# Patient Record
Sex: MALE | Race: WHITE | URBAN - METROPOLITAN AREA
[De-identification: names, ages, dates, MRNs, and addresses within clinical notes are randomized per-mention and may not be internally consistent; named-entity substitution may affect disease eponyms.]

---

## 2020-08-17 ENCOUNTER — NEW PATIENT (OUTPATIENT)
Dept: URBAN - METROPOLITAN AREA CLINIC 56 | Facility: CLINIC | Age: 70
End: 2020-08-17
Payer: MEDICARE

## 2020-08-17 DIAGNOSIS — H52.4 PRESBYOPIA: ICD-10-CM

## 2020-08-17 DIAGNOSIS — H25.813 COMBINED FORMS OF AGE-RELATED CATARACT, BILATERAL: Primary | ICD-10-CM

## 2020-08-17 DIAGNOSIS — S05.92XA UNSPECIFIED INJURY OF LEFT EYE AND ORBIT, INITIAL ENCOUNTER: ICD-10-CM

## 2020-08-17 DIAGNOSIS — H53.032 STRABISMIC AMBLYOPIA, LEFT EYE: ICD-10-CM

## 2020-08-17 PROCEDURE — 92004 COMPRE OPH EXAM NEW PT 1/>: CPT | Performed by: OPTOMETRIST

## 2020-08-17 PROCEDURE — 92250 FUNDUS PHOTOGRAPHY W/I&R: CPT | Performed by: OPTOMETRIST

## 2020-08-17 PROCEDURE — 92015 DETERMINE REFRACTIVE STATE: CPT | Performed by: OPTOMETRIST

## 2020-08-17 ASSESSMENT — INTRAOCULAR PRESSURE
OS: 8
OD: 8

## 2020-08-17 ASSESSMENT — VISUAL ACUITY
OS: 20/25
OD: 20/20

## 2020-08-17 ASSESSMENT — KERATOMETRY
OD: 42.03
OS: 40.74

## 2021-04-23 ENCOUNTER — OFFICE VISIT (OUTPATIENT)
Dept: URBAN - METROPOLITAN AREA CLINIC 44 | Facility: CLINIC | Age: 71
End: 2021-04-23
Payer: MEDICARE

## 2021-04-23 DIAGNOSIS — B30.0 EPIDEMIC KERATOCONJUNCTIVITIS: Primary | ICD-10-CM

## 2021-04-23 PROCEDURE — 99214 OFFICE O/P EST MOD 30 MIN: CPT | Performed by: OPTOMETRIST

## 2021-04-23 RX ORDER — ACYCLOVIR 400 MG/1
400 MG TABLET ORAL
Qty: 35 | Refills: 0 | Status: INACTIVE
Start: 2021-04-23 | End: 2021-05-02

## 2021-04-23 RX ORDER — PREDNISOLONE ACETATE 10 MG/ML
1 % SUSPENSION/ DROPS OPHTHALMIC
Qty: 10 | Refills: 0 | Status: INACTIVE
Start: 2021-04-23 | End: 2021-05-12

## 2021-04-23 NOTE — IMPRESSION/PLAN
Impression: Dry eye syndrome of bilateral lacrimal glands: H04.123. Plan: Patient's complaint is consistent with dry eye syndrome. There is no evidence of permanent changes to the cornea. Explained there are ways to improve the symptoms but no permanent fix for the symptoms. Reviewed options including topical treatments, punctal plugs and permanent punctal occlusion. use gel at night.

## 2021-04-23 NOTE — IMPRESSION/PLAN
Impression: Epidemic keratoconjunctivitis: B30.0. Plan: Pt has been on gent x 3 wks and getting worse, Treat as possible EKC start pf qid ou and aft gel q2h.  Acyclovir 400mg  5 x po for 1wk to # 40 cover possible disciform keratitis

## 2021-04-27 ENCOUNTER — OFFICE VISIT (OUTPATIENT)
Dept: URBAN - METROPOLITAN AREA CLINIC 44 | Facility: CLINIC | Age: 71
End: 2021-04-27
Payer: MEDICARE

## 2021-04-27 PROCEDURE — 92134 CPTRZ OPH DX IMG PST SGM RTA: CPT | Performed by: OPTOMETRIST

## 2021-04-27 PROCEDURE — 92012 INTRM OPH EXAM EST PATIENT: CPT | Performed by: OPTOMETRIST

## 2021-04-27 ASSESSMENT — INTRAOCULAR PRESSURE
OS: 13
OD: 12

## 2021-04-27 NOTE — IMPRESSION/PLAN
Impression: Punctate keratitis, bilateral: H16.143. Plan: Decrease pf BID OS  and continue aft gel q2h OU. Acyclovir 400mg  5 x po for  2 more days and d/c. Refer to Dr. Nick Prieto for CO. not EKC or viral, increase gel gtts q2h from qid and dec pf gtts bid OS only.  Dr Nick Prieto for opinion 4-6 wks CX appt if better

## 2021-05-12 ENCOUNTER — OFFICE VISIT (OUTPATIENT)
Dept: URBAN - METROPOLITAN AREA CLINIC 44 | Facility: CLINIC | Age: 71
End: 2021-05-12
Payer: MEDICARE

## 2021-05-12 PROCEDURE — 92025 CPTRIZED CORNEAL TOPOGRAPHY: CPT | Performed by: OPHTHALMOLOGY

## 2021-05-12 PROCEDURE — 76514 ECHO EXAM OF EYE THICKNESS: CPT | Performed by: OPHTHALMOLOGY

## 2021-05-12 PROCEDURE — 99203 OFFICE O/P NEW LOW 30 MIN: CPT | Performed by: OPHTHALMOLOGY

## 2021-05-12 ASSESSMENT — VISUAL ACUITY
OS: 20/80
OD: 20/60

## 2021-05-12 NOTE — IMPRESSION/PLAN
Impression: Punctate keratitis, bilateral: H16.143. Plan: Onset after what appears to be a viral conjunctivitis OU. Severe keratitis noted OU today. Start aggressive lubrication: PFATs q1hr q/a, andreas qhs OU Start Eysuvys/Inveltys QID OU x 2 weeks. Start Cequa OU BID Will place PROKERA SLIM OS first then OD

## 2021-05-26 ENCOUNTER — OFFICE VISIT (OUTPATIENT)
Dept: URBAN - METROPOLITAN AREA CLINIC 44 | Facility: CLINIC | Age: 71
End: 2021-05-26
Payer: MEDICARE

## 2021-05-26 PROCEDURE — 99213 OFFICE O/P EST LOW 20 MIN: CPT | Performed by: OPHTHALMOLOGY

## 2021-05-26 ASSESSMENT — INTRAOCULAR PRESSURE
OD: 16
OS: 16

## 2021-05-26 NOTE — IMPRESSION/PLAN
Impression: Punctate keratitis, bilateral: H16.143. Plan: Onset after what appears to be a viral conjunctivitis OU. Improved today, completed course of Inveltys Cont aggressive lubrication: PFATs q1hr q/a, andreas qhs OU Cont Cequa OU BID (hhas only been using a couple of days, will allow more time). 
Will place PROKERA SLIM OS first then OD if persistent nv

## 2021-06-08 ENCOUNTER — OFFICE VISIT (OUTPATIENT)
Dept: URBAN - METROPOLITAN AREA CLINIC 44 | Facility: CLINIC | Age: 71
End: 2021-06-08
Payer: MEDICARE

## 2021-06-08 DIAGNOSIS — H16.143 PUNCTATE KERATITIS, BILATERAL: Primary | ICD-10-CM

## 2021-06-08 PROCEDURE — 99213 OFFICE O/P EST LOW 20 MIN: CPT | Performed by: OPHTHALMOLOGY

## 2021-06-08 PROCEDURE — 65778 COVER EYE W/MEMBRANE: CPT | Performed by: OPHTHALMOLOGY

## 2021-06-08 RX ORDER — OFLOXACIN 3 MG/ML
0.3 % SOLUTION/ DROPS OPHTHALMIC
Qty: 5 | Refills: 1 | Status: INACTIVE
Start: 2021-06-08 | End: 2021-06-24

## 2021-06-08 NOTE — IMPRESSION/PLAN
Impression: Punctate keratitis, bilateral: H16.143. Plan: Onset after what appears to be a viral conjunctivitis OU. Pt feels worse today. Prokera slim placed OS Cont aggressive lubrication: PFATs q1hr q/a OU, andreas qhs OD Cont Cequa OU OD
RTC 1 week prokera ring removal OS and possible placement OD

## 2021-06-14 ENCOUNTER — OFFICE VISIT (OUTPATIENT)
Dept: URBAN - METROPOLITAN AREA CLINIC 44 | Facility: CLINIC | Age: 71
End: 2021-06-14
Payer: MEDICARE

## 2021-06-14 PROCEDURE — 99213 OFFICE O/P EST LOW 20 MIN: CPT | Performed by: OPHTHALMOLOGY

## 2021-06-14 PROCEDURE — 92071 CONTACT LENS FITTING FOR TX: CPT | Performed by: OPHTHALMOLOGY

## 2021-06-14 NOTE — IMPRESSION/PLAN
Impression: Punctate keratitis, bilateral: H16.143.
- Onset after what appears to be a viral conjunctivitis OU. Plan: Prokera slim placed removed OS, small abrasion noted, BCL 8.4 placed. Cont Oflox QID OS Cont aggressive lubrication: PFATs q1hr q/a OU, andreas qhs OD Cont Cequa OU OD

## 2021-06-18 ENCOUNTER — OFFICE VISIT (OUTPATIENT)
Dept: URBAN - METROPOLITAN AREA CLINIC 44 | Facility: CLINIC | Age: 71
End: 2021-06-18
Payer: MEDICARE

## 2021-06-18 PROCEDURE — 92012 INTRM OPH EXAM EST PATIENT: CPT | Performed by: OPTOMETRIST

## 2021-06-18 NOTE — IMPRESSION/PLAN
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 Plan: d/c bscl, continue all gtts as directed.

## 2021-07-26 ENCOUNTER — OFFICE VISIT (OUTPATIENT)
Dept: URBAN - METROPOLITAN AREA CLINIC 44 | Facility: CLINIC | Age: 71
End: 2021-07-26
Payer: MEDICARE

## 2021-07-26 PROCEDURE — 99213 OFFICE O/P EST LOW 20 MIN: CPT | Performed by: OPHTHALMOLOGY

## 2021-07-26 NOTE — IMPRESSION/PLAN
Impression: Punctate keratitis, bilateral: H16.143.
- Onset after what appears to be a viral conjunctivitis OU. Plan: Resolved s/p Prokera OU. d/c Oflox Cont lubrication: PFATs AT OU, andreas qhs OD
D/c [facility] May return to optom for routine monitoring

## 2022-01-04 ENCOUNTER — OFFICE VISIT (OUTPATIENT)
Dept: URBAN - METROPOLITAN AREA CLINIC 56 | Facility: CLINIC | Age: 72
End: 2022-01-04
Payer: MEDICARE

## 2022-01-04 PROCEDURE — 92134 CPTRZ OPH DX IMG PST SGM RTA: CPT | Performed by: OPTOMETRIST

## 2022-01-04 PROCEDURE — 99214 OFFICE O/P EST MOD 30 MIN: CPT | Performed by: OPTOMETRIST

## 2022-01-04 ASSESSMENT — KERATOMETRY
OS: 40.64
OD: 42.16

## 2022-01-04 ASSESSMENT — VISUAL ACUITY
OD: 20/20
OS: 20/25

## 2022-01-04 ASSESSMENT — INTRAOCULAR PRESSURE
OD: 15
OS: 14

## 2022-01-04 NOTE — IMPRESSION/PLAN
Impression: Punctate keratitis, bilateral: H16.143.
- Onset after what appears to be a viral conjunctivitis OU.  Plan: Cont lubrication: PFATs AT OU, andreas qhs OD

## 2022-01-04 NOTE — IMPRESSION/PLAN
Impression: Combined forms of age-related cataract, bilateral: H25.813. Plan: Discussed cataract diagnosis with the patient. Discussed and reviewed treatment options for cataracts. Risks and benefits of surgical treatment were discussed and understood. Patient elects surgical treatment. Recommend surgery OS. Patient is candidate/interested in standard lens, Aim OS: plano. Patient will need glasses for full time wear. Outcome of surgery limitations include:  Punctate keratitis, bilateral, Nodular corneal degeneration, bilateral, Past Refractive Surgery. Recommend Dr Stephany Sicard for cataract surgery.

## 2022-01-04 NOTE — IMPRESSION/PLAN
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 Plan: Appears stable at this time. Continue frequent lubrication daily. Cataract surgery with Dr Gurpreet Phillips.

## 2022-02-18 ENCOUNTER — TESTING ONLY (OUTPATIENT)
Dept: URBAN - METROPOLITAN AREA CLINIC 56 | Facility: CLINIC | Age: 72
End: 2022-02-18
Payer: MEDICARE

## 2022-02-18 DIAGNOSIS — Z01.818 ENCOUNTER FOR OTHER PREPROCEDURAL EXAMINATION: Primary | ICD-10-CM

## 2022-02-18 PROCEDURE — 99203 OFFICE O/P NEW LOW 30 MIN: CPT | Performed by: PHYSICIAN ASSISTANT

## 2022-02-18 PROCEDURE — 92025 CPTRIZED CORNEAL TOPOGRAPHY: CPT | Performed by: OPHTHALMOLOGY

## 2022-02-18 RX ORDER — ATORVASTATIN CALCIUM 40 MG/1
40 MG TABLET, FILM COATED ORAL
Qty: 0 | Refills: 0 | Status: ACTIVE
Start: 2022-02-18

## 2022-02-18 RX ORDER — LOSARTAN POTASSIUM 25 MG/1
25 MG TABLET ORAL
Qty: 0 | Refills: 0 | Status: ACTIVE
Start: 2022-02-18

## 2022-02-21 ENCOUNTER — PRE-OPERATIVE VISIT (OUTPATIENT)
Dept: URBAN - METROPOLITAN AREA CLINIC 44 | Facility: CLINIC | Age: 72
End: 2022-02-21
Payer: MEDICARE

## 2022-02-21 DIAGNOSIS — H18.453 NODULAR CORNEAL DEGENERATION, BILATERAL: ICD-10-CM

## 2022-02-21 DIAGNOSIS — H26.132 TOTAL TRAUMATIC CATARACT, LEFT EYE: Primary | ICD-10-CM

## 2022-02-21 DIAGNOSIS — Z98.890 OTHER SPECIFIED POSTPROCEDURAL STATES: ICD-10-CM

## 2022-02-21 PROCEDURE — 99214 OFFICE O/P EST MOD 30 MIN: CPT | Performed by: OPHTHALMOLOGY

## 2022-02-21 PROCEDURE — 76514 ECHO EXAM OF EYE THICKNESS: CPT | Performed by: OPHTHALMOLOGY

## 2022-02-21 ASSESSMENT — INTRAOCULAR PRESSURE
OS: 12
OD: 12

## 2022-02-21 NOTE — IMPRESSION/PLAN
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 Plan: Discussed cataract diagnosis with the patient. Risks and benefits of surgical treatment were discussed and understood. Patient elects surgical treatment. Specialty lens options, LenSx and ORA discussed. Patient does not mind wearing glasses after surgery. Patient desires surgery OS. Standard IOL OS,  AIM= Distance OU, ORA ok if pt desires,  AMP. Patient understands the need for glasses after surgery for BCVA. Dexycu OK. ** Discussed risk of zonular dehiscence and associated complications given remote h/o racketball injury.

## 2022-03-01 ENCOUNTER — SURGERY (OUTPATIENT)
Dept: URBAN - METROPOLITAN AREA SURGERY 19 | Facility: SURGERY | Age: 72
End: 2022-03-01
Payer: MEDICARE

## 2022-03-01 PROCEDURE — PR1CP PR1CP: CUSTOM | Performed by: OPHTHALMOLOGY

## 2022-03-01 PROCEDURE — 66984 XCAPSL CTRC RMVL W/O ECP: CPT | Performed by: OPHTHALMOLOGY

## 2022-03-02 ENCOUNTER — POST-OPERATIVE VISIT (OUTPATIENT)
Dept: URBAN - METROPOLITAN AREA CLINIC 56 | Facility: CLINIC | Age: 72
End: 2022-03-02

## 2022-03-02 DIAGNOSIS — Z48.810 ENCOUNTER FOR SURGICAL AFTERCARE FOLLOWING SURGERY ON A SENSE ORGAN: Primary | ICD-10-CM

## 2022-03-02 RX ORDER — DORZOLAMIDE HYDROCHLORIDE AND TIMOLOL MALEATE 20; 5 MG/ML; MG/ML
SOLUTION/ DROPS OPHTHALMIC
Qty: 5 | Refills: 0 | Status: ACTIVE
Start: 2022-03-02

## 2022-03-02 ASSESSMENT — INTRAOCULAR PRESSURE
OS: 34
OS: 38

## 2022-03-02 NOTE — IMPRESSION/PLAN
Impression: S/P Cataract Extraction by phacoemulsification with IOL placement; ORA OS - 1 Day. Encounter for surgical aftercare following surgery on a sense organ  Z48.170. Plan: S/p Cat Sx OS Aim (-0.25) Elevated IOP, Start Dorzolamide/Timolol BID OS 
RTC as scheduled for 3 WK PO

## 2022-03-22 ENCOUNTER — POST-OPERATIVE VISIT (OUTPATIENT)
Dept: URBAN - METROPOLITAN AREA CLINIC 56 | Facility: CLINIC | Age: 72
End: 2022-03-22

## 2022-03-22 PROCEDURE — 99024 POSTOP FOLLOW-UP VISIT: CPT | Performed by: OPTOMETRIST

## 2022-03-22 RX ORDER — PREDNISOLONE ACETATE 10 MG/ML
1 % SUSPENSION/ DROPS OPHTHALMIC
Qty: 5 | Refills: 0 | Status: ACTIVE
Start: 2022-03-22

## 2022-03-22 ASSESSMENT — VISUAL ACUITY
OD: 20/20
OS: 20/30

## 2022-03-22 ASSESSMENT — INTRAOCULAR PRESSURE
OD: 14
OS: 8
OS: 7

## 2022-03-22 NOTE — IMPRESSION/PLAN
Impression: S/P Cataract Extraction by phacoemulsification with IOL placement; ORA OS - 21 Days. Encounter for surgical aftercare following surgery on a sense organ  Z48.810. Plan: S/p Cat Sx OS Aim (-0.25) Discontinue Dorzolamide/Timolol. Start Pred Forte QID OS for 1 week then taper weekly. 
Increase ATs to Q1-2hr OU
RTC in 2 weeks for PO

## 2022-04-12 ENCOUNTER — POST-OPERATIVE VISIT (OUTPATIENT)
Dept: URBAN - METROPOLITAN AREA CLINIC 56 | Facility: CLINIC | Age: 72
End: 2022-04-12

## 2022-04-12 DIAGNOSIS — Z48.810 ENCOUNTER FOR SURGICAL AFTERCARE FOLLOWING SURGERY ON A SENSE ORGAN: Primary | ICD-10-CM

## 2022-04-12 PROCEDURE — 99024 POSTOP FOLLOW-UP VISIT: CPT | Performed by: OPTOMETRIST

## 2022-04-12 NOTE — IMPRESSION/PLAN
Impression: S/P Cataract Extraction by phacoemulsification with IOL placement; ORA OS - 42 Days. Encounter for surgical aftercare following surgery on a sense organ  Z48.810. Plan: S/p Cat Sx OS Aim (-0.25) Start PF ATs 4-6x times a day OU Start Gel gtts QHS OU Finish Pred Forte. If no improvement by next visit, will have patient follow up with Dr Andre Lane RTC in 2 weeks for PO

## 2022-04-26 ENCOUNTER — POST-OPERATIVE VISIT (OUTPATIENT)
Dept: URBAN - METROPOLITAN AREA CLINIC 56 | Facility: CLINIC | Age: 72
End: 2022-04-26
Payer: MEDICARE

## 2022-04-26 DIAGNOSIS — Z48.810 ENCOUNTER FOR SURGICAL AFTERCARE FOLLOWING SURGERY ON A SENSE ORGAN: Primary | ICD-10-CM

## 2022-04-26 PROCEDURE — 99024 POSTOP FOLLOW-UP VISIT: CPT | Performed by: OPTOMETRIST

## 2022-04-26 ASSESSMENT — VISUAL ACUITY
OS: 20/30
OD: 20/20

## 2022-04-26 ASSESSMENT — INTRAOCULAR PRESSURE
OD: 14
OS: 14

## 2022-04-26 NOTE — IMPRESSION/PLAN
Impression: S/P Cataract Extraction by phacoemulsification with IOL placement; ORA OS - 56 Days. Encounter for surgical aftercare following surgery on a sense organ  Z48.810. Plan: S/p Cat Sx OS Inserted 0.3mm extended wear plug LLL. VOU:[id number] EXP: 12-31-22 Continue PF ATs 4-6x times a day OU Continue Gel gtts QHS OU
RTC with Dr Roxanne Gillis for PO

## 2022-04-27 ENCOUNTER — OFFICE VISIT (OUTPATIENT)
Dept: URBAN - METROPOLITAN AREA CLINIC 44 | Facility: CLINIC | Age: 72
End: 2022-04-27
Payer: MEDICARE

## 2022-04-27 DIAGNOSIS — H26.492 OTHER SECONDARY CATARACT, LEFT EYE: Primary | ICD-10-CM

## 2022-04-27 PROCEDURE — 99024 POSTOP FOLLOW-UP VISIT: CPT | Performed by: OPHTHALMOLOGY

## 2022-04-27 NOTE — IMPRESSION/PLAN
Impression: Other secondary cataract, left eye: H26.492. Plan: Opacified capsule with option for YAG laser capsulotomy. Discussed procedure, risks, benefits and side effects. Patient agrees to YAG laser capsulotomy.  Schedule YAG laser OS

## 2022-04-27 NOTE — IMPRESSION/PLAN
Impression: S/P Cataract Extraction by phacoemulsification with IOL placement; ORA OS Plan: S/p Cat Sx OS 2/2 traumatic cataract. C/o blurry vision, suspect PCO. Superior nodules noted, likely not visual significant Corneal surface appears improved with lubrication and PP Will plan for yag OS

## 2022-05-10 ENCOUNTER — SURGERY (OUTPATIENT)
Dept: URBAN - METROPOLITAN AREA SURGERY 19 | Facility: SURGERY | Age: 72
End: 2022-05-10
Payer: MEDICARE

## 2022-05-10 PROCEDURE — 66821 AFTER CATARACT LASER SURGERY: CPT | Performed by: OPHTHALMOLOGY

## 2022-06-03 ENCOUNTER — POST-OPERATIVE VISIT (OUTPATIENT)
Dept: URBAN - METROPOLITAN AREA CLINIC 56 | Facility: CLINIC | Age: 72
End: 2022-06-03
Payer: MEDICARE

## 2022-06-03 DIAGNOSIS — H26.492 OTHER SECONDARY CATARACT, LEFT EYE: ICD-10-CM

## 2022-06-03 DIAGNOSIS — Z96.1 PRESENCE OF INTRAOCULAR LENS: Primary | ICD-10-CM

## 2022-06-03 PROCEDURE — 99024 POSTOP FOLLOW-UP VISIT: CPT | Performed by: STUDENT IN AN ORGANIZED HEALTH CARE EDUCATION/TRAINING PROGRAM

## 2022-06-03 PROCEDURE — 92134 CPTRZ OPH DX IMG PST SGM RTA: CPT | Performed by: STUDENT IN AN ORGANIZED HEALTH CARE EDUCATION/TRAINING PROGRAM

## 2022-06-03 RX ORDER — LOTEPREDNOL ETABONATE 3.8 MG/G
0.38 % GEL OPHTHALMIC
Qty: 5 | Refills: 0 | Status: ACTIVE
Start: 2022-06-03

## 2022-06-03 ASSESSMENT — KERATOMETRY
OD: 42.08
OS: 40.71

## 2022-06-03 ASSESSMENT — VISUAL ACUITY
OS: 20/25
OD: 20/30

## 2022-06-03 ASSESSMENT — INTRAOCULAR PRESSURE
OS: 11
OD: 9

## 2022-06-03 NOTE — IMPRESSION/PLAN
Impression: S/P YAG Capsulotomy (Yttrium Aluminum [place]) OS - 24 Days. Presence of intraocular lens  Z96.1. Plan: good IOP. Restrictions discussed. Call with worsening pain or vision. Start Lotemax SM BID OS only for two week then d/c Start Systane night time gel gtt's Placed 0.4mm temp plug LLL 

h/o improvement with Prokera slim OU. H/o [place]. Possible neurotrophic component? Consider membrane vs Oxervate vs serum tears in the future RTC 2-3 weeks for cornea check

## 2022-07-14 ENCOUNTER — OFFICE VISIT (OUTPATIENT)
Dept: URBAN - METROPOLITAN AREA CLINIC 56 | Facility: CLINIC | Age: 72
End: 2022-07-14
Payer: MEDICARE

## 2022-07-14 DIAGNOSIS — H16.223 KERATOCONJUNCTIVITIS SICCA, BILATERAL: Primary | ICD-10-CM

## 2022-07-14 DIAGNOSIS — Z96.1 PRESENCE OF INTRAOCULAR LENS: ICD-10-CM

## 2022-07-14 PROCEDURE — 68761 CLOSE TEAR DUCT OPENING: CPT | Performed by: STUDENT IN AN ORGANIZED HEALTH CARE EDUCATION/TRAINING PROGRAM

## 2022-07-14 PROCEDURE — 99214 OFFICE O/P EST MOD 30 MIN: CPT | Performed by: STUDENT IN AN ORGANIZED HEALTH CARE EDUCATION/TRAINING PROGRAM

## 2022-07-14 NOTE — IMPRESSION/PLAN
Impression: Keratoconjunctivitis sicca, bilateral: H16.223.
h/o improvement with Prokera slim OU but recurrent
h/o Cequa - no improvement in symptoms  Plan: significant improvement in corneal appearance and vision, but pt still symptomatic for fluctuating vision. Discussed additional options include RegenerEyes vs Oxervate ? Possible neurotrophic component. Recommend repeat consult with Dr. Florinda Lilly if still symptomatic. Continue frequent ATs - gave order for 20% conc. serum tears Start genteal andreas QHS Placed 5.1KC silicone plug LLL

## 2022-07-25 ENCOUNTER — OFFICE VISIT (OUTPATIENT)
Dept: URBAN - METROPOLITAN AREA CLINIC 44 | Facility: CLINIC | Age: 72
End: 2022-07-25
Payer: MEDICARE

## 2022-07-25 DIAGNOSIS — Z48.810 ENCOUNTER FOR SURGICAL AFTERCARE FOLLOWING SURGERY ON A SENSE ORGAN: Primary | ICD-10-CM

## 2022-07-25 PROCEDURE — 99024 POSTOP FOLLOW-UP VISIT: CPT | Performed by: OPHTHALMOLOGY

## 2022-07-25 NOTE — IMPRESSION/PLAN
Impression: S/P Cataract Extraction by phacoemulsification with IOL placement; ORA OS Plan: S/p Cat Sx OS 2/2 traumatic cataract, s/p yag Far peripheral mild nodules noted, likely not visual significant Corneal surface appears improved with lubrication and PP Explained to pt that given issues OS, likely best vision. No further surgery indicated at this time.   May return to optom for monitoring,

## 2023-01-09 ENCOUNTER — OFFICE VISIT (OUTPATIENT)
Dept: URBAN - METROPOLITAN AREA CLINIC 56 | Facility: LOCATION | Age: 73
End: 2023-01-09
Payer: MEDICARE

## 2023-01-09 DIAGNOSIS — H18.453 NODULAR CORNEAL DEGENERATION, BILATERAL: Primary | ICD-10-CM

## 2023-01-09 DIAGNOSIS — Z96.1 PRESENCE OF INTRAOCULAR LENS: ICD-10-CM

## 2023-01-09 DIAGNOSIS — Z98.890 OTHER SPECIFIED POSTPROCEDURAL STATES: ICD-10-CM

## 2023-01-09 DIAGNOSIS — H25.811 COMBINED FORMS OF AGE-RELATED CATARACT, RIGHT EYE: ICD-10-CM

## 2023-01-09 DIAGNOSIS — H43.813 VITREOUS DEGENERATION, BILATERAL: ICD-10-CM

## 2023-01-09 PROCEDURE — 92014 COMPRE OPH EXAM EST PT 1/>: CPT | Performed by: OPTOMETRIST

## 2023-01-09 PROCEDURE — 92134 CPTRZ OPH DX IMG PST SGM RTA: CPT | Performed by: OPTOMETRIST

## 2023-01-09 ASSESSMENT — VISUAL ACUITY
OD: 20/20
OS: 20/25

## 2023-01-09 ASSESSMENT — KERATOMETRY
OS: 40.51
OD: 42.22

## 2023-01-09 ASSESSMENT — INTRAOCULAR PRESSURE
OS: 10
OD: 8

## 2023-01-09 NOTE — IMPRESSION/PLAN
Impression: Vitreous degeneration, bilateral: H43.813. Plan: Discussed with patient. RD precautions given.

## 2023-01-09 NOTE — IMPRESSION/PLAN
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 Plan: Stable. Continue ATs frequently.

## 2024-03-06 ENCOUNTER — OFFICE VISIT (OUTPATIENT)
Dept: URBAN - METROPOLITAN AREA CLINIC 56 | Facility: LOCATION | Age: 74
End: 2024-03-06
Payer: MEDICARE

## 2024-03-06 DIAGNOSIS — Z98.890 OTHER SPECIFIED POSTPROCEDURAL STATES: ICD-10-CM

## 2024-03-06 DIAGNOSIS — H43.813 VITREOUS DEGENERATION, BILATERAL: ICD-10-CM

## 2024-03-06 DIAGNOSIS — Z96.1 PRESENCE OF INTRAOCULAR LENS: ICD-10-CM

## 2024-03-06 DIAGNOSIS — H25.811 COMBINED FORMS OF AGE-RELATED CATARACT, RIGHT EYE: Primary | ICD-10-CM

## 2024-03-06 DIAGNOSIS — H16.223 KERATOCONJUNCTIVITIS SICCA, BILATERAL: ICD-10-CM

## 2024-03-06 DIAGNOSIS — H18.453 NODULAR CORNEAL DEGENERATION, BILATERAL: ICD-10-CM

## 2024-03-06 PROCEDURE — 92134 CPTRZ OPH DX IMG PST SGM RTA: CPT | Performed by: OPTOMETRIST

## 2024-03-06 PROCEDURE — 92014 COMPRE OPH EXAM EST PT 1/>: CPT | Performed by: OPTOMETRIST

## 2024-03-06 ASSESSMENT — INTRAOCULAR PRESSURE
OD: 13
OS: 13

## 2024-03-06 ASSESSMENT — KERATOMETRY
OS: 40.80
OD: 41.98

## 2024-03-06 ASSESSMENT — VISUAL ACUITY
OD: 20/30
OS: 20/50

## 2024-04-08 ENCOUNTER — OFFICE VISIT (OUTPATIENT)
Dept: URBAN - METROPOLITAN AREA CLINIC 56 | Facility: LOCATION | Age: 74
End: 2024-04-08
Payer: MEDICARE

## 2024-04-08 DIAGNOSIS — H18.453 NODULAR CORNEAL DEGENERATION, BILATERAL: ICD-10-CM

## 2024-04-08 DIAGNOSIS — H16.223 KERATOCONJUNCTIVITIS SICCA, BILATERAL: Primary | ICD-10-CM

## 2024-04-08 PROCEDURE — 99213 OFFICE O/P EST LOW 20 MIN: CPT | Performed by: OPTOMETRIST

## 2024-04-08 ASSESSMENT — INTRAOCULAR PRESSURE
OS: 17
OD: 14

## 2024-04-08 ASSESSMENT — KERATOMETRY
OS: 40.42
OD: 42.03

## 2025-04-09 ENCOUNTER — OFFICE VISIT (OUTPATIENT)
Dept: URBAN - METROPOLITAN AREA CLINIC 56 | Facility: LOCATION | Age: 75
End: 2025-04-09
Payer: MEDICARE

## 2025-04-09 DIAGNOSIS — H52.4 PRESBYOPIA: ICD-10-CM

## 2025-04-09 DIAGNOSIS — H16.223 KERATOCONJUNCTIVITIS SICCA, BILATERAL: ICD-10-CM

## 2025-04-09 DIAGNOSIS — H02.135 SENILE ECTROPION OF LEFT LOWER LID: ICD-10-CM

## 2025-04-09 DIAGNOSIS — H53.022 REFRACTIVE AMBLYOPIA, LEFT EYE: ICD-10-CM

## 2025-04-09 DIAGNOSIS — H35.363 DRUSEN (DEGENERATIVE) OF MACULA, BILATERAL: Primary | ICD-10-CM

## 2025-04-09 DIAGNOSIS — H10.45 OTHER CHRONIC ALLERGIC CONJUNCTIVITIS: ICD-10-CM

## 2025-04-09 PROCEDURE — 92134 CPTRZ OPH DX IMG PST SGM RTA: CPT | Performed by: OPHTHALMOLOGY

## 2025-04-09 PROCEDURE — 92014 COMPRE OPH EXAM EST PT 1/>: CPT | Performed by: OPHTHALMOLOGY

## 2025-04-09 ASSESSMENT — INTRAOCULAR PRESSURE
OD: 13
OS: 12

## 2025-04-09 ASSESSMENT — KERATOMETRY
OD: 42.24
OS: 40.67

## 2025-04-09 ASSESSMENT — VISUAL ACUITY
OD: 20/20
OS: 20/25